# Patient Record
Sex: FEMALE | Race: WHITE | NOT HISPANIC OR LATINO | Employment: FULL TIME | URBAN - METROPOLITAN AREA
[De-identification: names, ages, dates, MRNs, and addresses within clinical notes are randomized per-mention and may not be internally consistent; named-entity substitution may affect disease eponyms.]

---

## 2022-01-03 ENCOUNTER — TRANSFERRED RECORDS (OUTPATIENT)
Dept: HEALTH INFORMATION MANAGEMENT | Facility: CLINIC | Age: 62
End: 2022-01-03
Payer: COMMERCIAL

## 2022-01-03 ENCOUNTER — MEDICAL CORRESPONDENCE (OUTPATIENT)
Dept: HEALTH INFORMATION MANAGEMENT | Facility: CLINIC | Age: 62
End: 2022-01-03
Payer: COMMERCIAL

## 2022-01-04 ENCOUNTER — TRANSCRIBE ORDERS (OUTPATIENT)
Dept: OTHER | Age: 62
End: 2022-01-04

## 2022-01-04 DIAGNOSIS — M53.3 CHRONIC LEFT SI JOINT PAIN: Primary | ICD-10-CM

## 2022-01-04 DIAGNOSIS — G89.29 CHRONIC LEFT SI JOINT PAIN: Primary | ICD-10-CM

## 2022-04-08 ENCOUNTER — TELEPHONE (OUTPATIENT)
Dept: ORTHOPEDICS | Facility: CLINIC | Age: 62
End: 2022-04-08
Payer: COMMERCIAL

## 2022-04-08 NOTE — TELEPHONE ENCOUNTER
[facility] Call Center    Phone Message    May a detailed message be left on voicemail: yes     Reason for Call: Other: Pt calling in re: procedure that was canceled with Dr. Herrera. Pt said she is working with Marina and would like call back at # [phone]  Pt is looking for another option since it is going to be billed to work comp and is unable to have this procedure done at the .         Action Taken: Message routed to:  [facility] ([facility]): Orthopedics - Dr. Herrera    Travel Screening: Not Applicable

## 2022-04-12 NOTE — TELEPHONE ENCOUNTER
See phone message from pt. Error in call center note she was calling about a canceled appt not a procedure.    She had to cancel NEW consult appt with  due to Insurance/work comp.    I tried to call heR back & left VM that her options are to F/U with referring DR at [facility] & she can also contact [facility] [employer] through [email or web address]  to find provider for the SacroIliac Joint consult that would be covered by work comp.  [employer] staff will help pt.    Call back prn.  Marina Whitaker RN.